# Patient Record
Sex: FEMALE
[De-identification: names, ages, dates, MRNs, and addresses within clinical notes are randomized per-mention and may not be internally consistent; named-entity substitution may affect disease eponyms.]

---

## 2020-04-09 PROBLEM — Z00.00 ENCOUNTER FOR PREVENTIVE HEALTH EXAMINATION: Status: ACTIVE | Noted: 2020-04-09

## 2020-04-10 ENCOUNTER — APPOINTMENT (OUTPATIENT)
Dept: FAMILY MEDICINE | Facility: CLINIC | Age: 50
End: 2020-04-10
Payer: COMMERCIAL

## 2020-04-10 DIAGNOSIS — Z76.89 PERSONS ENCOUNTERING HEALTH SERVICES IN OTHER SPECIFIED CIRCUMSTANCES: ICD-10-CM

## 2020-04-10 DIAGNOSIS — U07.1 COVID-19: ICD-10-CM

## 2020-04-10 DIAGNOSIS — R11.0 NAUSEA: ICD-10-CM

## 2020-04-10 DIAGNOSIS — R05 COUGH: ICD-10-CM

## 2020-04-10 PROCEDURE — 99202 OFFICE O/P NEW SF 15 MIN: CPT | Mod: 95

## 2020-04-10 RX ORDER — ONDANSETRON 4 MG/1
4 TABLET ORAL
Qty: 30 | Refills: 0 | Status: ACTIVE | COMMUNITY
Start: 2020-04-10 | End: 1900-01-01

## 2020-04-10 RX ORDER — BROMPHENIRAMINE MALEATE, PSEUDOEPHEDRINE HYDROCHLORIDE AND DEXTROMETHORPHAN HYDROBROMIDE 2; 30; 10 MG/5ML; MG/5ML; MG/5ML
30-2-10 SYRUP ORAL
Qty: 1 | Refills: 0 | Status: ACTIVE | COMMUNITY
Start: 2020-04-10 | End: 1900-01-01

## 2020-04-10 NOTE — PAST MEDICAL HISTORY
[Postmenopausal] : history of menopause having occurred [Menopause Age____] : age at menopause was [unfilled] [Total Preg ___] : G: [unfilled] [Living ___] : Living: [unfilled]

## 2020-04-10 NOTE — HISTORY OF PRESENT ILLNESS
[Home] : at home, [unfilled] , at the time of the visit. [Medical Office: ([facility])___] : at ~his/her~ medical office located in [place] [Patient] : the patient [FreeTextEntry8] : 48 y/o HF with no significant past medical hx, tested Positive for COVID on 4/3/20 at [facility] MD.\par Pt with no PCP, needs to establish care.\par Pt states symptoms started on 4/1/20. Pt reports cough with clear mucus, fever, nausea. Not able to tolerate fluids.\par Pt denies sob or difficulty breathing.\par Pt had + contacts at work.\par Not working since 4/1/20.

## 2020-04-10 NOTE — ASSESSMENT
[FreeTextEntry1] : Pt evaluated by telemedicine, to establish care after recent Dx COVID on 4/3/20.\par \par -Start Zofran and Bronfed.\par -Tylenol prn.\par -ER eval if sob or difficulty breathing.\par -Call back prn.

## 2020-04-10 NOTE — PHYSICAL EXAM
[No Acute Distress] : no acute distress [Ill-Appearing] : ill-appearing [de-identified] : Pt in bed

## 2020-04-10 NOTE — HEALTH RISK ASSESSMENT
[Intercurrent Urgi Care visits] : went to urgent care [No] : In the past 12 months have you used drugs other than those required for medical reasons? No [No falls in past year] : Patient reported no falls in the past year [] : No [de-identified] : 4/1/20

## 2020-04-17 NOTE — HISTORY OF PRESENT ILLNESS
[FreeTextEntry1] : Called patient to f/u on [employer], left English speaking voice message to see how she was feeling and if she could call me back.  Patient's voice message was spoken in [place]i.  RUTH ALONSO

## 2020-04-23 ENCOUNTER — APPOINTMENT (OUTPATIENT)
Dept: FAMILY MEDICINE | Facility: CLINIC | Age: 50
End: 2020-04-23

## 2020-08-13 ENCOUNTER — APPOINTMENT (OUTPATIENT)
Dept: GASTROENTEROLOGY | Facility: CLINIC | Age: 50
End: 2020-08-13